# Patient Record
Sex: FEMALE | Race: WHITE
[De-identification: names, ages, dates, MRNs, and addresses within clinical notes are randomized per-mention and may not be internally consistent; named-entity substitution may affect disease eponyms.]

---

## 2018-01-22 ENCOUNTER — HOSPITAL ENCOUNTER (OUTPATIENT)
Dept: HOSPITAL 53 - M LAB REF | Age: 58
End: 2018-01-22
Attending: INTERNAL MEDICINE
Payer: COMMERCIAL

## 2018-01-22 DIAGNOSIS — D50.9: Primary | ICD-10-CM

## 2018-01-22 LAB
FOLATE SERPL-MCNC: 23.9 NG/ML
VIT B12 SERPL-MCNC: 724 PG/ML

## 2018-07-24 ENCOUNTER — HOSPITAL ENCOUNTER (OUTPATIENT)
Dept: HOSPITAL 53 - M LAB REF | Age: 58
End: 2018-07-24
Attending: INTERNAL MEDICINE
Payer: COMMERCIAL

## 2018-07-24 DIAGNOSIS — Z98.84: ICD-10-CM

## 2018-07-24 DIAGNOSIS — D50.9: Primary | ICD-10-CM

## 2018-07-24 LAB — FERRITIN: 17 NG/ML (ref 8–252)

## 2019-01-28 ENCOUNTER — HOSPITAL ENCOUNTER (OUTPATIENT)
Dept: HOSPITAL 53 - M LAB REF | Age: 59
End: 2019-01-28
Attending: INTERNAL MEDICINE
Payer: COMMERCIAL

## 2019-01-28 DIAGNOSIS — Z98.84: ICD-10-CM

## 2019-01-28 DIAGNOSIS — D50.9: Primary | ICD-10-CM

## 2019-01-28 LAB
FERRITIN SERPL-MCNC: 15 NG/ML (ref 8–252)
FOLATE SERPL-MCNC: > 24 NG/ML (ref 5.4–?)

## 2019-01-29 LAB — VIT B12 SERPL-MCNC: 729 PG/ML (ref 232–1245)

## 2020-01-30 ENCOUNTER — HOSPITAL ENCOUNTER (OUTPATIENT)
Dept: HOSPITAL 53 - M LAB REF | Age: 60
End: 2020-01-30
Attending: INTERNAL MEDICINE
Payer: COMMERCIAL

## 2020-01-30 DIAGNOSIS — Z98.84: Primary | ICD-10-CM

## 2020-01-30 LAB
FERRITIN SERPL-MCNC: 11 NG/ML (ref 8–252)
FOLATE SERPL-MCNC: > 24 NG/ML
VIT B12 SERPL-MCNC: 564 PG/ML

## 2020-05-27 ENCOUNTER — HOSPITAL ENCOUNTER (OUTPATIENT)
Dept: HOSPITAL 53 - M LAB REF | Age: 60
End: 2020-05-27
Attending: DERMATOLOGY
Payer: COMMERCIAL

## 2020-05-27 DIAGNOSIS — C44.310: Primary | ICD-10-CM

## 2021-02-03 ENCOUNTER — HOSPITAL ENCOUNTER (OUTPATIENT)
Dept: HOSPITAL 53 - M LAB REF | Age: 61
End: 2021-02-03
Attending: INTERNAL MEDICINE
Payer: COMMERCIAL

## 2021-02-03 DIAGNOSIS — Z98.84: ICD-10-CM

## 2021-02-03 DIAGNOSIS — D50.9: Primary | ICD-10-CM

## 2021-02-03 LAB
FERRITIN SERPL-MCNC: 8 NG/ML (ref 8–252)
FOLATE SERPL-MCNC: > 24 NG/ML
VIT B12 SERPL-MCNC: 613 PG/ML

## 2021-04-09 ENCOUNTER — HOSPITAL ENCOUNTER (OUTPATIENT)
Dept: HOSPITAL 53 - M LABSMTC | Age: 61
End: 2021-04-09
Attending: ANESTHESIOLOGY
Payer: COMMERCIAL

## 2021-04-09 DIAGNOSIS — Z20.822: ICD-10-CM

## 2021-04-09 DIAGNOSIS — Z01.812: Primary | ICD-10-CM

## 2021-04-14 ENCOUNTER — HOSPITAL ENCOUNTER (OUTPATIENT)
Dept: HOSPITAL 53 - M OPP | Age: 61
Discharge: HOME | End: 2021-04-14
Attending: INTERNAL MEDICINE
Payer: COMMERCIAL

## 2021-04-14 VITALS — DIASTOLIC BLOOD PRESSURE: 85 MMHG | SYSTOLIC BLOOD PRESSURE: 140 MMHG

## 2021-04-14 VITALS — HEIGHT: 62.5 IN | BODY MASS INDEX: 33.01 KG/M2 | WEIGHT: 184 LBS

## 2021-04-14 DIAGNOSIS — Z98.84: ICD-10-CM

## 2021-04-14 DIAGNOSIS — Z86.010: ICD-10-CM

## 2021-04-14 DIAGNOSIS — Z12.11: Primary | ICD-10-CM

## 2021-04-14 DIAGNOSIS — Z91.040: ICD-10-CM

## 2021-04-14 DIAGNOSIS — Z83.71: ICD-10-CM

## 2021-04-14 NOTE — ROOR
________________________________________________________________________________

Patient Name: Melissa Salamanca                Procedure Date: 4/14/2021 12:20 PM

MRN: [id number]                          Account Number: [id number]

YOB: 1960                Age: 60

Room: [facility]                            Gender: Female

Note Status: Finalized                 

________________________________________________________________________________

 

Procedure:            Total Colonoscopy to Cecum + ileoscopy

Indications:          High risk colon cancer surveillance: Personal history of 

                      colonic polyps, Last colonoscopy: 2011

Providers:            Gonzales Bejarano MD

Referring MD:         Qing TIM MD

Requesting Provider:  

Medicines:            Monitored Anesthesia Care

Complications:        No immediate complications.

________________________________________________________________________________

Procedure:            Pre-Anesthesia Assessment:

                      - The heart rate, respiratory rate, oxygen saturations, 

                      blood pressure, adequacy of pulmonary ventilation, and 

                      response to care were monitored throughout the procedure.

                      The Colonoscope was introduced through the anus and 

                      advanced to the terminal ileum, with identification of 

                      the appendiceal orifice and IC valve. The colonoscopy 

                      was performed without difficulty. The patient tolerated 

                      the procedure well. The quality of the bowel preparation 

                      was excellent.

                                                                                

Findings:

     The perianal and digital rectal examinations were normal.

     No other significant abnormalities were identified in a careful 

     examination of the remainder of the colon.

     The exam was otherwise without abnormality on direct and retroflexion 

     views.

     The terminal ileum appeared normal.

                                                                                

Impression:           - The examination was otherwise normal on direct and 

                      retroflexion views.

                      - The examined portion of the ileum was normal.

                      - No specimens collected.

                      - The exam was otherwise normal to the cecum.

Recommendation:       - Patient has a contact number available for 

                      emergencies. The signs and symptoms of potential delayed 

                      complications were discussed with the patient. Return to 

                      normal activities tomorrow. Written discharge 

                      instructions were provided to the patient.

                      - High fiber diet.

                      - Discharge patient to home.

                      - Continue present medications.

                      - Repeat colonoscopy in 5 years for surveillance.

                      - Return to referring physician.

                      - The findings and recommendations were discussed with 

                      the patient.

                                                                                

Procedure Code(s):    --- Professional ---

                      , Colorectal cancer screening; colonoscopy on 

                      individual at high risk

Diagnosis Code(s):    --- Professional ---

                      Z86.010, Personal history of colonic polyps

 

CPT copyright 2019 [employer]. All rights reserved.

 

The codes documented in this report are preliminary and upon  review may 

be revised to meet current compliance requirements.

 

Gonzales Bejarano MD

____________________

Gonzales Bejarano MD

4/14/2021 12:42:22 PM

Electronically signed by Gonzales Bejarano MD

Number of Addenda: 0

 

Note Initiated On: 4/14/2021 12:20 PM

Estimated Blood Loss: Estimated blood loss: none.

## 2021-08-03 ENCOUNTER — HOSPITAL ENCOUNTER (OUTPATIENT)
Dept: HOSPITAL 53 - M LAB REF | Age: 61
End: 2021-08-03
Attending: INTERNAL MEDICINE
Payer: COMMERCIAL

## 2021-08-03 DIAGNOSIS — D50.9: Primary | ICD-10-CM

## 2021-10-05 ENCOUNTER — HOSPITAL ENCOUNTER (OUTPATIENT)
Dept: HOSPITAL 53 - M WHC | Age: 61
End: 2021-10-05
Attending: INTERNAL MEDICINE
Payer: COMMERCIAL

## 2021-10-05 DIAGNOSIS — M81.0: Primary | ICD-10-CM

## 2021-10-05 NOTE — DEXAMM
INDICATION:

OTHER DISORDER OF BONE DENSITY AND STRUCTURE UNSPEC.



COMPARISON:

July 23, 2019.



TECHNIQUE:

Bone density was measured using dual-energy x-ray absorptionmetry (DEXA).



FINDINGS:

AP SPINE L1-L4

BMD 0.929 g/cm2

Young Adult T-Score -2.1

Age Matched Z-Score -0.8.



LT FEMUR, TOTAL

BMD 0.918 g/cm2

Young Adult T-Score -0.7

Age Matched Z-Score 0.3.



LT NECK

BMD 0.860 g/cm2

Young Adult T-Score -1.3

Age Matched Z-Score 0.1.



RT FEMUR, TOTAL

BMD 0.891 g/cm2

Young Adult T-Score -0.9

Age Matched Z-Score 0.1.



RT NECK

BMD 0.801 g/cm2

Young Adult T-Score -1.7

Age Matched Z-Score -0.4.



IMPRESSION:

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



There is low bone density of the left hip.





There is low bone density of the right hip.



The density of the spine has decreased 12.8% since the initial exam on February 26, 2013.





The density of the spine decreased 0.3% since the most recent exam on July 23, 2019.





The density of the left hip has decreased 8.3% since the initial exam on February 26, 2013.





The density of the left hip has decreased 1.2% since the most recent exam on July 23, 2019.





The density of the right hip has decreased 7.8% since the initial exam on February 26, 2013.





The density of the right hip has decreased 0.3% since the most recent exam on July 23, 2019.



FOLLOW-UP:

Recommendation for the next bone density exam: 2 years.





<Electronically signed by Reji Collins > 10/05/21 [id number]

## 2022-02-03 ENCOUNTER — HOSPITAL ENCOUNTER (OUTPATIENT)
Dept: HOSPITAL 53 - M LAB REF | Age: 62
End: 2022-02-03
Attending: INTERNAL MEDICINE
Payer: COMMERCIAL

## 2022-02-03 DIAGNOSIS — D50.9: Primary | ICD-10-CM

## 2022-02-03 LAB — FERRITIN SERPL-MCNC: 9 NG/ML (ref 8–252)

## 2022-02-04 LAB
FOLATE SERPL-MCNC: 23 NG/ML
VIT B12 SERPL-MCNC: 728 PG/ML

## 2022-07-31 ENCOUNTER — HOSPITAL ENCOUNTER (OUTPATIENT)
Dept: HOSPITAL 53 - M RAD | Age: 62
End: 2022-07-31
Attending: PHYSICIAN ASSISTANT
Payer: COMMERCIAL

## 2022-07-31 DIAGNOSIS — M19.072: ICD-10-CM

## 2022-07-31 DIAGNOSIS — S93.402A: Primary | ICD-10-CM

## 2022-10-31 ENCOUNTER — HOSPITAL ENCOUNTER (OUTPATIENT)
Dept: HOSPITAL 53 - M LAB | Age: 62
End: 2022-10-31
Attending: PHYSICIAN ASSISTANT
Payer: COMMERCIAL

## 2022-10-31 DIAGNOSIS — M19.072: Primary | ICD-10-CM

## 2022-10-31 LAB
CRP SERPL-MCNC: < 0.3 MG/DL (ref 0–0.3)
RHEUMATOID FACT SERPL-ACNC: < 10 IU/ML (ref ?–15)
URATE SERPL-MCNC: 4.5 MG/DL (ref 2.6–6)

## 2023-02-10 ENCOUNTER — HOSPITAL ENCOUNTER (OUTPATIENT)
Dept: HOSPITAL 53 - M LAB REF | Age: 63
End: 2023-02-10
Attending: INTERNAL MEDICINE
Payer: COMMERCIAL

## 2023-02-10 DIAGNOSIS — D50.9: Primary | ICD-10-CM

## 2023-02-10 LAB
FERRITIN SERPL-MCNC: 6.6 NG/ML (ref 7.3–270.7)
FOLATE SERPL-MCNC: > 24 NG/ML (ref 5.4–?)
VIT B12 SERPL-MCNC: 604 PG/ML (ref 211–911)

## 2023-05-09 ENCOUNTER — HOSPITAL ENCOUNTER (OUTPATIENT)
Dept: HOSPITAL 53 - M SFHCWAGY | Age: 63
End: 2023-05-09
Attending: OBSTETRICS & GYNECOLOGY
Payer: COMMERCIAL

## 2023-05-09 DIAGNOSIS — N84.1: Primary | ICD-10-CM

## 2023-08-15 ENCOUNTER — HOSPITAL ENCOUNTER (OUTPATIENT)
Dept: HOSPITAL 53 - M LAB REF | Age: 63
End: 2023-08-15
Attending: INTERNAL MEDICINE
Payer: COMMERCIAL

## 2023-08-15 DIAGNOSIS — D50.9: Primary | ICD-10-CM

## 2024-02-16 ENCOUNTER — HOSPITAL ENCOUNTER (OUTPATIENT)
Dept: HOSPITAL 53 - M LAB REF | Age: 64
End: 2024-02-16
Attending: INTERNAL MEDICINE
Payer: COMMERCIAL

## 2024-02-16 DIAGNOSIS — D50.9: Primary | ICD-10-CM

## 2024-02-16 LAB
FERRITIN SERPL-MCNC: 35.1 NG/ML (ref 7.3–270.7)
FOLATE SERPL-MCNC: > 24 NG/ML (ref 5.4–?)
VIT B12 SERPL-MCNC: 1001 PG/ML (ref 211–911)

## 2024-10-31 ENCOUNTER — HOSPITAL ENCOUNTER (OUTPATIENT)
Dept: HOSPITAL 53 - M LAB REF | Age: 64
End: 2024-10-31
Attending: INTERNAL MEDICINE
Payer: MEDICARE

## 2024-10-31 DIAGNOSIS — M25.571: Primary | ICD-10-CM

## 2024-11-05 LAB
B BURGDOR IGG SER IA-ACNC: <= 0.9 INDEX (ref ?–0.9)
B BURGDOR IGG+IGM SER QL IA: 2.84 INDEX (ref ?–0.9)
B BURGDOR IGM SER IA-ACNC: <= 0.9 INDEX (ref ?–0.9)